# Patient Record
Sex: FEMALE | Race: BLACK OR AFRICAN AMERICAN | NOT HISPANIC OR LATINO
[De-identification: names, ages, dates, MRNs, and addresses within clinical notes are randomized per-mention and may not be internally consistent; named-entity substitution may affect disease eponyms.]

---

## 2020-03-16 PROBLEM — Z00.00 ENCOUNTER FOR PREVENTIVE HEALTH EXAMINATION: Status: ACTIVE | Noted: 2020-03-16

## 2020-03-17 ENCOUNTER — APPOINTMENT (OUTPATIENT)
Dept: OBGYN | Facility: CLINIC | Age: 40
End: 2020-03-17

## 2020-07-17 ENCOUNTER — ASOB RESULT (OUTPATIENT)
Age: 40
End: 2020-07-17

## 2020-07-17 ENCOUNTER — APPOINTMENT (OUTPATIENT)
Dept: ANTEPARTUM | Facility: CLINIC | Age: 40
End: 2020-07-17
Payer: COMMERCIAL

## 2020-07-17 PROCEDURE — 76811 OB US DETAILED SNGL FETUS: CPT

## 2020-07-17 PROCEDURE — 76819 FETAL BIOPHYS PROFIL W/O NST: CPT

## 2020-08-19 ENCOUNTER — TRANSCRIPTION ENCOUNTER (OUTPATIENT)
Age: 40
End: 2020-08-19

## 2020-08-20 ENCOUNTER — INPATIENT (INPATIENT)
Facility: HOSPITAL | Age: 40
LOS: 4 days | Discharge: HOME | End: 2020-08-25
Attending: OBSTETRICS & GYNECOLOGY | Admitting: OBSTETRICS & GYNECOLOGY
Payer: COMMERCIAL

## 2020-08-20 ENCOUNTER — RESULT REVIEW (OUTPATIENT)
Age: 40
End: 2020-08-20

## 2020-08-20 VITALS — WEIGHT: 255.07 LBS | HEIGHT: 66 IN

## 2020-08-20 DIAGNOSIS — O03.9 COMPLETE OR UNSPECIFIED SPONTANEOUS ABORTION WITHOUT COMPLICATION: Chronic | ICD-10-CM

## 2020-08-20 DIAGNOSIS — Z98.890 OTHER SPECIFIED POSTPROCEDURAL STATES: Chronic | ICD-10-CM

## 2020-08-20 DIAGNOSIS — O26.899 OTHER SPECIFIED PREGNANCY RELATED CONDITIONS, UNSPECIFIED TRIMESTER: ICD-10-CM

## 2020-08-20 DIAGNOSIS — Z3A.00 WEEKS OF GESTATION OF PREGNANCY NOT SPECIFIED: ICD-10-CM

## 2020-08-20 DIAGNOSIS — Z33.2 ENCOUNTER FOR ELECTIVE TERMINATION OF PREGNANCY: Chronic | ICD-10-CM

## 2020-08-20 LAB
BASOPHILS # BLD AUTO: 0.01 K/UL — SIGNIFICANT CHANGE UP (ref 0–0.2)
BASOPHILS NFR BLD AUTO: 0.1 % — SIGNIFICANT CHANGE UP (ref 0–2)
BLD GP AB SCN SERPL QL: NEGATIVE — SIGNIFICANT CHANGE UP
EOSINOPHIL # BLD AUTO: 0.07 K/UL — SIGNIFICANT CHANGE UP (ref 0–0.5)
EOSINOPHIL NFR BLD AUTO: 0.8 % — SIGNIFICANT CHANGE UP (ref 0–6)
HBV SURFACE AG SER-ACNC: NEGATIVE — SIGNIFICANT CHANGE UP
HCT VFR BLD CALC: 37.2 % — SIGNIFICANT CHANGE UP (ref 34.5–45)
HGB BLD-MCNC: 12.4 G/DL — SIGNIFICANT CHANGE UP (ref 11.5–15.5)
HIV COMBO RESULT: SIGNIFICANT CHANGE UP
HIV1+2 AB SPEC QL: SIGNIFICANT CHANGE UP
IMM GRANULOCYTES NFR BLD AUTO: 0.5 % — SIGNIFICANT CHANGE UP (ref 0–1.5)
LYMPHOCYTES # BLD AUTO: 1.61 K/UL — SIGNIFICANT CHANGE UP (ref 1–3.3)
LYMPHOCYTES # BLD AUTO: 19 % — SIGNIFICANT CHANGE UP (ref 13–44)
MCHC RBC-ENTMCNC: 29 PG — SIGNIFICANT CHANGE UP (ref 27–34)
MCHC RBC-ENTMCNC: 33.3 % — SIGNIFICANT CHANGE UP (ref 32–36)
MCV RBC AUTO: 87.1 FL — SIGNIFICANT CHANGE UP (ref 80–100)
MONOCYTES # BLD AUTO: 0.53 K/UL — SIGNIFICANT CHANGE UP (ref 0–0.9)
MONOCYTES NFR BLD AUTO: 6.3 % — SIGNIFICANT CHANGE UP (ref 2–14)
NEUTROPHILS # BLD AUTO: 6.2 K/UL — SIGNIFICANT CHANGE UP (ref 1.8–7.4)
NEUTROPHILS NFR BLD AUTO: 73.3 % — SIGNIFICANT CHANGE UP (ref 43–77)
NRBC # FLD: 0 K/UL — SIGNIFICANT CHANGE UP (ref 0–0)
PLATELET # BLD AUTO: 206 K/UL — SIGNIFICANT CHANGE UP (ref 150–400)
PMV BLD: 10.4 FL — SIGNIFICANT CHANGE UP (ref 7–13)
RBC # BLD: 4.27 M/UL — SIGNIFICANT CHANGE UP (ref 3.8–5.2)
RBC # FLD: 13.9 % — SIGNIFICANT CHANGE UP (ref 10.3–14.5)
RH IG SCN BLD-IMP: POSITIVE — SIGNIFICANT CHANGE UP
RH IG SCN BLD-IMP: POSITIVE — SIGNIFICANT CHANGE UP
SARS-COV-2 RNA SPEC QL NAA+PROBE: SIGNIFICANT CHANGE UP
T PALLIDUM AB TITR SER: NEGATIVE — SIGNIFICANT CHANGE UP
WBC # BLD: 8.46 K/UL — SIGNIFICANT CHANGE UP (ref 3.8–10.5)
WBC # FLD AUTO: 8.46 K/UL — SIGNIFICANT CHANGE UP (ref 3.8–10.5)

## 2020-08-20 RX ORDER — OXYTOCIN 10 UNIT/ML
2 VIAL (ML) INJECTION
Qty: 30 | Refills: 0 | Status: DISCONTINUED | OUTPATIENT
Start: 2020-08-20 | End: 2020-08-21

## 2020-08-20 RX ORDER — OXYTOCIN 10 UNIT/ML
333.33 VIAL (ML) INJECTION
Qty: 20 | Refills: 0 | Status: DISCONTINUED | OUTPATIENT
Start: 2020-08-20 | End: 2020-08-21

## 2020-08-20 RX ORDER — SODIUM CHLORIDE 9 MG/ML
1000 INJECTION, SOLUTION INTRAVENOUS ONCE
Refills: 0 | Status: COMPLETED | OUTPATIENT
Start: 2020-08-20 | End: 2020-08-21

## 2020-08-20 RX ORDER — SODIUM CHLORIDE 9 MG/ML
1000 INJECTION, SOLUTION INTRAVENOUS
Refills: 0 | Status: DISCONTINUED | OUTPATIENT
Start: 2020-08-20 | End: 2020-08-21

## 2020-08-20 RX ADMIN — Medication 2 MILLIUNIT(S)/MIN: at 20:35

## 2020-08-20 NOTE — OB PROVIDER TRIAGE NOTE - NSOBPROVIDERNOTE_OBGYN_ALL_OB_FT
41 y/o   @ 37.3 wks gest presents with c/o sROM @ 0800 mod amt clear odorless amniotic fluid with mild irregular cramping denies any VB reports +FM denies any n/v/d denies any fever or chills ap care has been uncomplicated thus far         abdomen: soft, nt on palp  + pooling  SVE: /-3  TAS vtx  EFW: 3175 grams  T(C): 37.1 (20 @ 09:00), Max: 37.1 (20 @ 08:58)  HR: 90 (20 @ 09:14) (84 - 90)  BP: 115/75 (20 @ 09:14) (115/75 - 131/83)  RR: 15 (20 @ 08:58) (15 - 15)  SpO2: --  cat 1 FHT  toco: irregular   d/w dr ahuja/ dr hanks  admit to L&D  pROM @ 37.3 wks gestation for po cytotec IOL  see admission orders      NKA  med hx: denies  surg hx:  D&C x's 2  gyn hx: denies  ob hx:  2008  FT 7#14  ETOP x's 2   SAB x's 2 complete  meds:  PNV    ht: 5'6  wt: 255

## 2020-08-20 NOTE — OB RN PATIENT PROFILE - PSH
Spontaneous   x2  Termination of pregnancy (fetus)  x2 H/O hand surgery  tumor removed 10 yrs ago  Spontaneous   x2  Termination of pregnancy (fetus)  x2

## 2020-08-20 NOTE — OB PROVIDER TRIAGE NOTE - NSOBPROC_OBGYN_ALL_OB
Patient is aware of echo results.  Patient verbalized understanding and was encouraged to keep follow up. JUAN FF,OBIE  
Unknown at This Time

## 2020-08-20 NOTE — OB PROVIDER H&P - ASSESSMENT
39 y/o   @ 37.3 wks gest presents with c/o sROM @ 0800 mod amt clear odorless amniotic fluid with mild irregular cramping denies any VB reports +FM denies any n/v/d denies any fever or chills ap care has been uncomplicated thus far         abdomen: soft, nt on palp  + pooling  SVE: 50/-3  TAS vtx  EFW: 3175 grams  T(C): 37.1 (20 @ 09:00), Max: 37.1 (20 @ 08:58)  HR: 90 (20 @ 09:14) (84 - 90)  BP: 115/75 (20 @ 09:14) (115/75 - 131/83)  RR: 15 (20 @ 08:58) (15 - 15)  SpO2: --  cat 1 FHT  toco: irregular   GBS- neg 2020  d/w dr ahuja/ dr hanks  admit to L&D  pROM @ 37.3 wks gestation for po cytotec IOL  see admission orders      NKA  med hx: denies  surg hx:  D&C x's 2  gyn hx: denies  ob hx:  2008  FT 7#14  ETOP x's 2   SAB x's 2 complete  meds:  PNV    ht: 5'6  wt: 255

## 2020-08-20 NOTE — CHART NOTE - NSCHARTNOTEFT_GEN_A_CORE
OB Attending Progress Note    Patient seen and evaluated at bedside.  Denies complaints.     T(C): 36.8 (08-20-20 @ 11:41), Max: 37.1 (08-20-20 @ 08:58)  HR: 81 (08-20-20 @ 09:44) (76 - 90)  BP: 114/70 (08-20-20 @ 09:44) (114/70 - 131/83)  RR: 16 (08-20-20 @ 09:27) (15 - 16)  SpO2: --    SVE:  1/50/-3  posterior/ firm tone    EFM: cateogry 1   [place]:  ctx q4 mins     A/P 40y P1 admitted for IOL PROM  -Labor:  start PO cytotec   -Fetus: cateogry 1 tracing  -GBS negative   -Analgesia: declines at this time     MARY Camara MD

## 2020-08-21 ENCOUNTER — TRANSCRIPTION ENCOUNTER (OUTPATIENT)
Age: 40
End: 2020-08-21

## 2020-08-21 LAB
ALBUMIN SERPL ELPH-MCNC: 2.8 G/DL — LOW (ref 3.3–5)
ALBUMIN SERPL ELPH-MCNC: 3 G/DL — LOW (ref 3.3–5)
ALP SERPL-CCNC: 114 U/L — SIGNIFICANT CHANGE UP (ref 40–120)
ALP SERPL-CCNC: 115 U/L — SIGNIFICANT CHANGE UP (ref 40–120)
ALT FLD-CCNC: 12 U/L — SIGNIFICANT CHANGE UP (ref 4–33)
ALT FLD-CCNC: 12 U/L — SIGNIFICANT CHANGE UP (ref 4–33)
ANION GAP SERPL CALC-SCNC: 13 MMO/L — SIGNIFICANT CHANGE UP (ref 7–14)
ANION GAP SERPL CALC-SCNC: 16 MMO/L — HIGH (ref 7–14)
ANISOCYTOSIS BLD QL: SLIGHT — SIGNIFICANT CHANGE UP
APPEARANCE UR: CLEAR — SIGNIFICANT CHANGE UP
APTT BLD: 27.5 SEC — SIGNIFICANT CHANGE UP (ref 27–36.3)
APTT BLD: 28.5 SEC — SIGNIFICANT CHANGE UP (ref 27–36.3)
AST SERPL-CCNC: 14 U/L — SIGNIFICANT CHANGE UP (ref 4–32)
AST SERPL-CCNC: 16 U/L — SIGNIFICANT CHANGE UP (ref 4–32)
BACTERIA # UR AUTO: NEGATIVE — SIGNIFICANT CHANGE UP
BASOPHILS # BLD AUTO: 0.01 K/UL — SIGNIFICANT CHANGE UP (ref 0–0.2)
BASOPHILS # BLD AUTO: 0.02 K/UL — SIGNIFICANT CHANGE UP (ref 0–0.2)
BASOPHILS NFR BLD AUTO: 0.1 % — SIGNIFICANT CHANGE UP (ref 0–2)
BASOPHILS NFR BLD AUTO: 0.1 % — SIGNIFICANT CHANGE UP (ref 0–2)
BASOPHILS NFR SPEC: 0 % — SIGNIFICANT CHANGE UP (ref 0–2)
BILIRUB SERPL-MCNC: 0.3 MG/DL — SIGNIFICANT CHANGE UP (ref 0.2–1.2)
BILIRUB SERPL-MCNC: 0.4 MG/DL — SIGNIFICANT CHANGE UP (ref 0.2–1.2)
BILIRUB UR-MCNC: NEGATIVE — SIGNIFICANT CHANGE UP
BLASTS # FLD: 0 % — SIGNIFICANT CHANGE UP (ref 0–0)
BLOOD UR QL VISUAL: NEGATIVE — SIGNIFICANT CHANGE UP
BUN SERPL-MCNC: 6 MG/DL — LOW (ref 7–23)
BUN SERPL-MCNC: 7 MG/DL — SIGNIFICANT CHANGE UP (ref 7–23)
CALCIUM SERPL-MCNC: 8.7 MG/DL — SIGNIFICANT CHANGE UP (ref 8.4–10.5)
CALCIUM SERPL-MCNC: 8.9 MG/DL — SIGNIFICANT CHANGE UP (ref 8.4–10.5)
CHLORIDE SERPL-SCNC: 100 MMOL/L — SIGNIFICANT CHANGE UP (ref 98–107)
CHLORIDE SERPL-SCNC: 100 MMOL/L — SIGNIFICANT CHANGE UP (ref 98–107)
CO2 SERPL-SCNC: 18 MMOL/L — LOW (ref 22–31)
CO2 SERPL-SCNC: 19 MMOL/L — LOW (ref 22–31)
COLOR SPEC: YELLOW — SIGNIFICANT CHANGE UP
CREAT ?TM UR-MCNC: 114.1 MG/DL — SIGNIFICANT CHANGE UP
CREAT SERPL-MCNC: 0.64 MG/DL — SIGNIFICANT CHANGE UP (ref 0.5–1.3)
CREAT SERPL-MCNC: 0.65 MG/DL — SIGNIFICANT CHANGE UP (ref 0.5–1.3)
EOSINOPHIL # BLD AUTO: 0 K/UL — SIGNIFICANT CHANGE UP (ref 0–0.5)
EOSINOPHIL # BLD AUTO: 0.02 K/UL — SIGNIFICANT CHANGE UP (ref 0–0.5)
EOSINOPHIL NFR BLD AUTO: 0 % — SIGNIFICANT CHANGE UP (ref 0–6)
EOSINOPHIL NFR BLD AUTO: 0.1 % — SIGNIFICANT CHANGE UP (ref 0–6)
EOSINOPHIL NFR FLD: 0 % — SIGNIFICANT CHANGE UP (ref 0–6)
FIBRINOGEN PPP-MCNC: 656 MG/DL — HIGH (ref 290–520)
FIBRINOGEN PPP-MCNC: 705 MG/DL — HIGH (ref 290–520)
GIANT PLATELETS BLD QL SMEAR: PRESENT — SIGNIFICANT CHANGE UP
GLUCOSE SERPL-MCNC: 107 MG/DL — HIGH (ref 70–99)
GLUCOSE SERPL-MCNC: 76 MG/DL — SIGNIFICANT CHANGE UP (ref 70–99)
GLUCOSE UR-MCNC: NEGATIVE — SIGNIFICANT CHANGE UP
HCT VFR BLD CALC: 31.4 % — LOW (ref 34.5–45)
HCT VFR BLD CALC: 33.9 % — LOW (ref 34.5–45)
HGB BLD-MCNC: 10.7 G/DL — LOW (ref 11.5–15.5)
HGB BLD-MCNC: 11.5 G/DL — SIGNIFICANT CHANGE UP (ref 11.5–15.5)
HYALINE CASTS # UR AUTO: SIGNIFICANT CHANGE UP
IMM GRANULOCYTES NFR BLD AUTO: 0.5 % — SIGNIFICANT CHANGE UP (ref 0–1.5)
IMM GRANULOCYTES NFR BLD AUTO: 0.5 % — SIGNIFICANT CHANGE UP (ref 0–1.5)
INR BLD: 1.02 — SIGNIFICANT CHANGE UP (ref 0.88–1.16)
INR BLD: 1.03 — SIGNIFICANT CHANGE UP (ref 0.88–1.16)
KETONES UR-MCNC: SIGNIFICANT CHANGE UP
LDH SERPL L TO P-CCNC: 176 U/L — SIGNIFICANT CHANGE UP (ref 135–225)
LDH SERPL L TO P-CCNC: 196 U/L — SIGNIFICANT CHANGE UP (ref 135–225)
LEUKOCYTE ESTERASE UR-ACNC: NEGATIVE — SIGNIFICANT CHANGE UP
LYMPHOCYTES # BLD AUTO: 0.58 K/UL — LOW (ref 1–3.3)
LYMPHOCYTES # BLD AUTO: 1.2 K/UL — SIGNIFICANT CHANGE UP (ref 1–3.3)
LYMPHOCYTES # BLD AUTO: 3.8 % — LOW (ref 13–44)
LYMPHOCYTES # BLD AUTO: 8.3 % — LOW (ref 13–44)
LYMPHOCYTES NFR SPEC AUTO: 1.7 % — LOW (ref 13–44)
MCHC RBC-ENTMCNC: 30.2 PG — SIGNIFICANT CHANGE UP (ref 27–34)
MCHC RBC-ENTMCNC: 30.4 PG — SIGNIFICANT CHANGE UP (ref 27–34)
MCHC RBC-ENTMCNC: 33.9 % — SIGNIFICANT CHANGE UP (ref 32–36)
MCHC RBC-ENTMCNC: 34.1 % — SIGNIFICANT CHANGE UP (ref 32–36)
MCV RBC AUTO: 89 FL — SIGNIFICANT CHANGE UP (ref 80–100)
MCV RBC AUTO: 89.2 FL — SIGNIFICANT CHANGE UP (ref 80–100)
METAMYELOCYTES # FLD: 0 % — SIGNIFICANT CHANGE UP (ref 0–1)
MICROCYTES BLD QL: SLIGHT — SIGNIFICANT CHANGE UP
MONOCYTES # BLD AUTO: 0.8 K/UL — SIGNIFICANT CHANGE UP (ref 0–0.9)
MONOCYTES # BLD AUTO: 0.86 K/UL — SIGNIFICANT CHANGE UP (ref 0–0.9)
MONOCYTES NFR BLD AUTO: 5.2 % — SIGNIFICANT CHANGE UP (ref 2–14)
MONOCYTES NFR BLD AUTO: 6 % — SIGNIFICANT CHANGE UP (ref 2–14)
MONOCYTES NFR BLD: 4.3 % — SIGNIFICANT CHANGE UP (ref 2–9)
MYELOCYTES NFR BLD: 0 % — SIGNIFICANT CHANGE UP (ref 0–0)
NEUTROPHIL AB SER-ACNC: 89.6 % — HIGH (ref 43–77)
NEUTROPHILS # BLD AUTO: 12.26 K/UL — HIGH (ref 1.8–7.4)
NEUTROPHILS # BLD AUTO: 13.96 K/UL — HIGH (ref 1.8–7.4)
NEUTROPHILS NFR BLD AUTO: 85 % — HIGH (ref 43–77)
NEUTROPHILS NFR BLD AUTO: 90.4 % — HIGH (ref 43–77)
NEUTS BAND # BLD: 3.5 % — SIGNIFICANT CHANGE UP (ref 0–6)
NITRITE UR-MCNC: NEGATIVE — SIGNIFICANT CHANGE UP
NRBC # FLD: 0 K/UL — SIGNIFICANT CHANGE UP (ref 0–0)
NRBC # FLD: 0 K/UL — SIGNIFICANT CHANGE UP (ref 0–0)
OTHER - HEMATOLOGY %: 0 — SIGNIFICANT CHANGE UP
PH UR: 7 — SIGNIFICANT CHANGE UP (ref 5–8)
PLATELET # BLD AUTO: 174 K/UL — SIGNIFICANT CHANGE UP (ref 150–400)
PLATELET # BLD AUTO: 185 K/UL — SIGNIFICANT CHANGE UP (ref 150–400)
PLATELET COUNT - ESTIMATE: NORMAL — SIGNIFICANT CHANGE UP
PMV BLD: 10.7 FL — SIGNIFICANT CHANGE UP (ref 7–13)
PMV BLD: 10.7 FL — SIGNIFICANT CHANGE UP (ref 7–13)
POLYCHROMASIA BLD QL SMEAR: SLIGHT — SIGNIFICANT CHANGE UP
POTASSIUM SERPL-MCNC: 3.5 MMOL/L — SIGNIFICANT CHANGE UP (ref 3.5–5.3)
POTASSIUM SERPL-MCNC: 3.5 MMOL/L — SIGNIFICANT CHANGE UP (ref 3.5–5.3)
POTASSIUM SERPL-SCNC: 3.5 MMOL/L — SIGNIFICANT CHANGE UP (ref 3.5–5.3)
POTASSIUM SERPL-SCNC: 3.5 MMOL/L — SIGNIFICANT CHANGE UP (ref 3.5–5.3)
PROMYELOCYTES # FLD: 0 % — SIGNIFICANT CHANGE UP (ref 0–0)
PROT SERPL-MCNC: 5.5 G/DL — LOW (ref 6–8.3)
PROT SERPL-MCNC: 5.7 G/DL — LOW (ref 6–8.3)
PROT UR-MCNC: 15.9 MG/DL — SIGNIFICANT CHANGE UP
PROT UR-MCNC: 20 — SIGNIFICANT CHANGE UP
PROTHROM AB SERPL-ACNC: 11.7 SEC — SIGNIFICANT CHANGE UP (ref 10.6–13.6)
PROTHROM AB SERPL-ACNC: 11.8 SEC — SIGNIFICANT CHANGE UP (ref 10.6–13.6)
RBC # BLD: 3.52 M/UL — LOW (ref 3.8–5.2)
RBC # BLD: 3.81 M/UL — SIGNIFICANT CHANGE UP (ref 3.8–5.2)
RBC # FLD: 13.9 % — SIGNIFICANT CHANGE UP (ref 10.3–14.5)
RBC # FLD: 14.1 % — SIGNIFICANT CHANGE UP (ref 10.3–14.5)
RBC CASTS # UR COMP ASSIST: HIGH (ref 0–?)
SODIUM SERPL-SCNC: 132 MMOL/L — LOW (ref 135–145)
SODIUM SERPL-SCNC: 134 MMOL/L — LOW (ref 135–145)
SP GR SPEC: 1.02 — SIGNIFICANT CHANGE UP (ref 1–1.04)
SQUAMOUS # UR AUTO: SIGNIFICANT CHANGE UP
URATE SERPL-MCNC: 4.3 MG/DL — SIGNIFICANT CHANGE UP (ref 2.5–7)
URATE SERPL-MCNC: 4.3 MG/DL — SIGNIFICANT CHANGE UP (ref 2.5–7)
UROBILINOGEN FLD QL: NORMAL — SIGNIFICANT CHANGE UP
VARIANT LYMPHS # BLD: 0.9 % — SIGNIFICANT CHANGE UP
WBC # BLD: 14.42 K/UL — HIGH (ref 3.8–10.5)
WBC # BLD: 15.43 K/UL — HIGH (ref 3.8–10.5)
WBC # FLD AUTO: 14.42 K/UL — HIGH (ref 3.8–10.5)
WBC # FLD AUTO: 15.43 K/UL — HIGH (ref 3.8–10.5)
WBC UR QL: SIGNIFICANT CHANGE UP (ref 0–?)

## 2020-08-21 PROCEDURE — 88305 TISSUE EXAM BY PATHOLOGIST: CPT | Mod: 26

## 2020-08-21 PROCEDURE — 88307 TISSUE EXAM BY PATHOLOGIST: CPT | Mod: 26

## 2020-08-21 RX ORDER — CITRIC ACID/SODIUM CITRATE 300-500 MG
30 SOLUTION, ORAL ORAL ONCE
Refills: 0 | Status: COMPLETED | OUTPATIENT
Start: 2020-08-21 | End: 2020-08-21

## 2020-08-21 RX ORDER — DIPHENOXYLATE HCL/ATROPINE 2.5-.025MG
2 TABLET ORAL ONCE
Refills: 0 | Status: DISCONTINUED | OUTPATIENT
Start: 2020-08-21 | End: 2020-08-21

## 2020-08-21 RX ORDER — AMPICILLIN TRIHYDRATE 250 MG
CAPSULE ORAL
Refills: 0 | Status: DISCONTINUED | OUTPATIENT
Start: 2020-08-21 | End: 2020-08-21

## 2020-08-21 RX ORDER — HEPARIN SODIUM 5000 [USP'U]/ML
10000 INJECTION INTRAVENOUS; SUBCUTANEOUS EVERY 12 HOURS
Refills: 0 | Status: DISCONTINUED | OUTPATIENT
Start: 2020-08-21 | End: 2020-08-25

## 2020-08-21 RX ORDER — FAMOTIDINE 10 MG/ML
20 INJECTION INTRAVENOUS ONCE
Refills: 0 | Status: COMPLETED | OUTPATIENT
Start: 2020-08-21 | End: 2020-08-21

## 2020-08-21 RX ORDER — ACETAMINOPHEN 500 MG
975 TABLET ORAL
Refills: 0 | Status: DISCONTINUED | OUTPATIENT
Start: 2020-08-21 | End: 2020-08-25

## 2020-08-21 RX ORDER — IBUPROFEN 200 MG
600 TABLET ORAL EVERY 6 HOURS
Refills: 0 | Status: COMPLETED | OUTPATIENT
Start: 2020-08-21 | End: 2021-07-20

## 2020-08-21 RX ORDER — CARBOPROST TROMETHAMINE 250 UG/ML
250 INJECTION, SOLUTION INTRAMUSCULAR ONCE
Refills: 0 | Status: COMPLETED | OUTPATIENT
Start: 2020-08-21 | End: 2020-08-21

## 2020-08-21 RX ORDER — OXYTOCIN 10 UNIT/ML
2 VIAL (ML) INJECTION
Qty: 30 | Refills: 0 | Status: DISCONTINUED | OUTPATIENT
Start: 2020-08-21 | End: 2020-08-21

## 2020-08-21 RX ORDER — SODIUM CHLORIDE 9 MG/ML
1000 INJECTION, SOLUTION INTRAVENOUS ONCE
Refills: 0 | Status: COMPLETED | OUTPATIENT
Start: 2020-08-21 | End: 2020-08-21

## 2020-08-21 RX ORDER — SIMETHICONE 80 MG/1
80 TABLET, CHEWABLE ORAL EVERY 4 HOURS
Refills: 0 | Status: DISCONTINUED | OUTPATIENT
Start: 2020-08-21 | End: 2020-08-25

## 2020-08-21 RX ORDER — LANOLIN
1 OINTMENT (GRAM) TOPICAL EVERY 6 HOURS
Refills: 0 | Status: DISCONTINUED | OUTPATIENT
Start: 2020-08-21 | End: 2020-08-25

## 2020-08-21 RX ORDER — METOCLOPRAMIDE HCL 10 MG
10 TABLET ORAL ONCE
Refills: 0 | Status: COMPLETED | OUTPATIENT
Start: 2020-08-21 | End: 2020-08-21

## 2020-08-21 RX ORDER — GENTAMICIN SULFATE 40 MG/ML
410 VIAL (ML) INJECTION ONCE
Refills: 0 | Status: COMPLETED | OUTPATIENT
Start: 2020-08-21 | End: 2020-08-21

## 2020-08-21 RX ORDER — ACETAMINOPHEN 500 MG
3 TABLET ORAL
Qty: 0 | Refills: 0 | DISCHARGE
Start: 2020-08-21

## 2020-08-21 RX ORDER — DIPHENHYDRAMINE HCL 50 MG
25 CAPSULE ORAL EVERY 6 HOURS
Refills: 0 | Status: DISCONTINUED | OUTPATIENT
Start: 2020-08-21 | End: 2020-08-25

## 2020-08-21 RX ORDER — ERTAPENEM SODIUM 1 G/1
1000 INJECTION, POWDER, LYOPHILIZED, FOR SOLUTION INTRAMUSCULAR; INTRAVENOUS ONCE
Refills: 0 | Status: COMPLETED | OUTPATIENT
Start: 2020-08-21 | End: 2020-08-21

## 2020-08-21 RX ORDER — AMPICILLIN TRIHYDRATE 250 MG
2 CAPSULE ORAL ONCE
Refills: 0 | Status: COMPLETED | OUTPATIENT
Start: 2020-08-21 | End: 2020-08-21

## 2020-08-21 RX ORDER — ACETAMINOPHEN 500 MG
975 TABLET ORAL ONCE
Refills: 0 | Status: COMPLETED | OUTPATIENT
Start: 2020-08-21 | End: 2020-08-21

## 2020-08-21 RX ORDER — KETOROLAC TROMETHAMINE 30 MG/ML
30 SYRINGE (ML) INJECTION EVERY 6 HOURS
Refills: 0 | Status: DISCONTINUED | OUTPATIENT
Start: 2020-08-21 | End: 2020-08-22

## 2020-08-21 RX ORDER — IBUPROFEN 200 MG
1 TABLET ORAL
Qty: 0 | Refills: 0 | DISCHARGE
Start: 2020-08-21

## 2020-08-21 RX ORDER — MAGNESIUM HYDROXIDE 400 MG/1
30 TABLET, CHEWABLE ORAL
Refills: 0 | Status: DISCONTINUED | OUTPATIENT
Start: 2020-08-21 | End: 2020-08-25

## 2020-08-21 RX ORDER — ERTAPENEM SODIUM 1 G/1
1000 INJECTION, POWDER, LYOPHILIZED, FOR SOLUTION INTRAMUSCULAR; INTRAVENOUS EVERY 24 HOURS
Refills: 0 | Status: DISCONTINUED | OUTPATIENT
Start: 2020-08-22 | End: 2020-08-23

## 2020-08-21 RX ORDER — ERTAPENEM SODIUM 1 G/1
INJECTION, POWDER, LYOPHILIZED, FOR SOLUTION INTRAMUSCULAR; INTRAVENOUS
Refills: 0 | Status: DISCONTINUED | OUTPATIENT
Start: 2020-08-21 | End: 2020-08-23

## 2020-08-21 RX ORDER — SODIUM CHLORIDE 9 MG/ML
1000 INJECTION, SOLUTION INTRAVENOUS
Refills: 0 | Status: DISCONTINUED | OUTPATIENT
Start: 2020-08-21 | End: 2020-08-22

## 2020-08-21 RX ORDER — OXYCODONE HYDROCHLORIDE 5 MG/1
5 TABLET ORAL ONCE
Refills: 0 | Status: DISCONTINUED | OUTPATIENT
Start: 2020-08-21 | End: 2020-08-25

## 2020-08-21 RX ORDER — OXYTOCIN 10 UNIT/ML
333.33 VIAL (ML) INJECTION
Qty: 20 | Refills: 0 | Status: DISCONTINUED | OUTPATIENT
Start: 2020-08-21 | End: 2020-08-22

## 2020-08-21 RX ORDER — AMPICILLIN TRIHYDRATE 250 MG
2 CAPSULE ORAL EVERY 6 HOURS
Refills: 0 | Status: DISCONTINUED | OUTPATIENT
Start: 2020-08-21 | End: 2020-08-21

## 2020-08-21 RX ORDER — OXYCODONE HYDROCHLORIDE 5 MG/1
5 TABLET ORAL
Refills: 0 | Status: DISCONTINUED | OUTPATIENT
Start: 2020-08-21 | End: 2020-08-25

## 2020-08-21 RX ORDER — TETANUS TOXOID, REDUCED DIPHTHERIA TOXOID AND ACELLULAR PERTUSSIS VACCINE, ADSORBED 5; 2.5; 8; 8; 2.5 [IU]/.5ML; [IU]/.5ML; UG/.5ML; UG/.5ML; UG/.5ML
0.5 SUSPENSION INTRAMUSCULAR ONCE
Refills: 0 | Status: DISCONTINUED | OUTPATIENT
Start: 2020-08-21 | End: 2020-08-25

## 2020-08-21 RX ADMIN — Medication 500 MILLIGRAM(S): at 01:39

## 2020-08-21 RX ADMIN — Medication 2 TABLET(S): at 11:00

## 2020-08-21 RX ADMIN — Medication 0.2 MILLIGRAM(S): at 07:20

## 2020-08-21 RX ADMIN — CARBOPROST TROMETHAMINE 250 MICROGRAM(S): 250 INJECTION, SOLUTION INTRAMUSCULAR at 07:20

## 2020-08-21 RX ADMIN — Medication 30 MILLILITER(S): at 06:40

## 2020-08-21 RX ADMIN — Medication 975 MILLIGRAM(S): at 00:53

## 2020-08-21 RX ADMIN — Medication 975 MILLIGRAM(S): at 10:53

## 2020-08-21 RX ADMIN — SODIUM CHLORIDE 2000 MILLILITER(S): 9 INJECTION, SOLUTION INTRAVENOUS at 00:00

## 2020-08-21 RX ADMIN — SODIUM CHLORIDE 2000 MILLILITER(S): 9 INJECTION, SOLUTION INTRAVENOUS at 06:29

## 2020-08-21 RX ADMIN — ERTAPENEM SODIUM 120 MILLIGRAM(S): 1 INJECTION, POWDER, LYOPHILIZED, FOR SOLUTION INTRAMUSCULAR; INTRAVENOUS at 09:55

## 2020-08-21 RX ADMIN — Medication 30 MILLIGRAM(S): at 15:25

## 2020-08-21 RX ADMIN — Medication 216 GRAM(S): at 00:53

## 2020-08-21 RX ADMIN — Medication 10 MILLIGRAM(S): at 06:40

## 2020-08-21 RX ADMIN — Medication 1000 MILLIUNIT(S)/MIN: at 10:43

## 2020-08-21 RX ADMIN — Medication 975 MILLIGRAM(S): at 00:54

## 2020-08-21 RX ADMIN — HEPARIN SODIUM 10000 UNIT(S): 5000 INJECTION INTRAVENOUS; SUBCUTANEOUS at 15:24

## 2020-08-21 RX ADMIN — Medication 2 MILLIUNIT(S)/MIN: at 03:11

## 2020-08-21 RX ADMIN — Medication 30 MILLIGRAM(S): at 15:40

## 2020-08-21 RX ADMIN — FAMOTIDINE 20 MILLIGRAM(S): 10 INJECTION INTRAVENOUS at 06:40

## 2020-08-21 NOTE — BRIEF OPERATIVE NOTE - NSICDXBRIEFPROCEDURE_GEN_ALL_CORE_FT
PROCEDURES:  Myomectomy by abdominal approach 21-Aug-2020 08:46:53  Lora Hernandez  Primary  section 21-Aug-2020 08:46:16  Lora Hernandez

## 2020-08-21 NOTE — OB NEONATOLOGY/PEDIATRICIAN DELIVERY SUMMARY - NSPEDSNEONOTESA_OBGYN_ALL_OB_FT
Baby is a 37.4 week GA male born to a 39 y/o  mother via . Maternal history uncomplicated. Pregnancy uncomplicated. Maternal blood type . Prenatal labs negative, non-reactive respectively; rubella pending at time of delivery. GBS negative on . PROM at 0800 on , 24 hours before delivery. Clear fluids. Maternal temperature throughout rupture, Tmax 38.2. Dx by OB with chorioamnionitis. Baby born vigorous and crying spontaneously. Baby tachycardic 180s-200s. Warmed, dried, stimulated. Apgars 8/9 . EOS score 0.51. Mom plans to breastfeed and bottlefeed. No hepB. Circ requested. Admitted to NICU.

## 2020-08-21 NOTE — OB PROVIDER DELIVERY SUMMARY - NSPROVIDERDELIVERYNOTE_OBGYN_ALL_OB_FT
Attending Note   Pt had primary LTCS for category 2 tracing and chorio remote from delivery   Viable male infant  apgars 8/9, 7# boy for circ, transferred to NICU   uterine atony noted and resolved with massage, double pitocin, cytotec, hemabate and TXA   Will get placenta cultures/blood cultures and continue invanz postoperative   EBL 1000/   IVF   R Mary Attending Note   Pt had primary LTCS for category 2 tracing and chorio remote from delivery   Viable male infant  apgars 8/9, 7# boy for circ, transferred to NICU   uterine atony noted and resolved with massage, double pitocin, cytotec, hemabate and TXA   Will get placenta cultures/blood cultures and continue invanz postoperative   3 cm fibroid noted at the location of the hysterotomy and removed fibroid that was sent to path     EBL 1000/   IVF 2000       R Mary

## 2020-08-21 NOTE — DISCHARGE NOTE OB - CARE PROVIDER_API CALL
Sindhu Irby  OBSTETRICS AND GYNECOLOGY  [address], [address]  [place] [address]  Phone: [phone]  Fax: [phone]  Follow Up Time:

## 2020-08-21 NOTE — DISCHARGE NOTE OB - HOSPITAL COURSE
She presented with rupture of membranes and had a c/section for fetal tachycardia and chorioamnioitis   She received invanz after delivery.

## 2020-08-21 NOTE — DISCHARGE NOTE OB - MATERIALS PROVIDED
Immunization Record/Guide to Postpartum Care/Vaccinations/Shaken Baby Prevention Handout/[facility] [place] Screening Program/[facility] Hearing Screen Program/Birth Certificate Instructions

## 2020-08-21 NOTE — CHART NOTE - NSCHARTNOTEFT_GEN_A_CORE
Subjective  Patient seen and examined at bedside. FHR tracing showing fetal tachycardia 170s. Patient reporting pain with contractions. +LOF.     Objective  Vital Signs Last 24 Hrs  T(C): 38.2 (21 Aug 2020 00:40), Max: 38.2 (21 Aug 2020 00:40)  T(F): 100.76 (21 Aug 2020 00:40), Max: 100.76 (21 Aug 2020 00:40)  HR: 86 (21 Aug 2020 00:49) (66 - 96)  BP: 145/79 (21 Aug 2020 00:41) (114/70 - 145/82)  RR: 16 (20 Aug 2020 09:27) (15 - 16)  SpO2: 100% (21 Aug 2020 00:49) (91% - 100%)    Gen: NAD  Abd: soft, non-tender, gravid  SVE: unable to examine cervix    TAUS: bladder distended. Patient voided and vertex now seen on sono    FHT: baseline 170, mod variability, +accels, neg decels  [place]: reg ctx q2-4 min    A/P 41 yo  37w3d IOL PROM 730a, now with intrapartum fever 38.2  - Ampicillin/Gentamicin/Tylenol for fever  - epidural for pain control  - will place ISE and IUPC after epidural    seen with Dr. Derek Rodgers pgy4 Subjective  Patient seen and examined at bedside. FHR tracing showing fetal tachycardia 170s. Patient reporting pain with contractions. +LOF.     Objective  Vital Signs Last 24 Hrs  T(C): 38.2 (21 Aug 2020 00:40), Max: 38.2 (21 Aug 2020 00:40)  T(F): 100.76 (21 Aug 2020 00:40), Max: 100.76 (21 Aug 2020 00:40)  HR: 86 (21 Aug 2020 00:49) (66 - 96)  BP: 145/79 (21 Aug 2020 00:41) (114/70 - 145/82)  RR: 16 (20 Aug 2020 09:27) (15 - 16)  SpO2: 100% (21 Aug 2020 00:49) (91% - 100%)    Gen: NAD  Abd: soft, non-tender, gravid  SVE: unable to examine cervix    TAUS: bladder distended. Patient voided and vertex now seen on sono    FHT: baseline 170, mod variability, +accels, neg decels  [place]: reg ctx q2-4 min    A/P 41 yo  37w3d IOL PROM 730a, now with intrapartum fever 38.2  - Ampicillin/Gentamicin/Tylenol for fever  - epidural for pain control Plan to reexamine s/p epidural   - consider placement of  ISE and IUPC after epidural, If necessary if not able to access/ monitor FHR    seen with Dr. Derek Rodgers pgy4    Attending note    I evaluated the tracing and patient with Dr Rodgers and agree with evaluation and management

## 2020-08-21 NOTE — OB RN INTRAOPERATIVE NOTE - NS_UTERINEINCISION_OBGYN_ALL_OB_DT
Procedure Ordered:CARDIAC ANGIO





Reason for Exam Today: NSTEMI





Previous Exams:





Allergies:NKA





Current Medications Taken:

Glucophage ( )  Metformin ( )





Previous reaction to contrast media:  Yes ( ) No ( )



Pregnant:       Yes ( ) No ( )             Asthma:           Yes ( ) No ( )

Diabetes:       Yes ( ) No ( )             Myeloma:          Yes ( ) No ( )

Heart Disease:  Yes ( ) No ( )             Cardiac Disease:  Yes ( ) No ( )

Kidney Disease: Yes ( ) No ( )             Vascular Disease: Yes ( ) No ( )



**NO TO ALL ABOVE

_______________________________________________________________________________

Patient Teaching done:  Yes (X ) No ( )      Used: Yes ( ) No ( )

                                           Name of :                              
                              Language Used:



As part of the test requested by your doctor, contrast media may be injected into your vein 
while the x-rays are being taken. Occasionally, reactions from IV contrast may occur. The 
physician and staff of this hospital are trained to treat these reactions.

_______________________________________________________________________________

Select the type of Contrast that will be given to patient:



Isovue 300 ( )   Isovue 370 ( ) Visipaque ( ) Cystografin ( ) 



Gastrographin ( ) Redi-cat ( ) Volumen ( ) OMNIPAQUE 350 (X)





Amount of contrast to be given:   98ML               IV (X )  PO ( )





Date given: 1/3/19





Lab Values:   BUN:   11         Creatinine: 0.56



Reason why contrast cannot be given:





Location of patient pre-procedure: [facility]





Location of patient post procedure: [facility] 21-Aug-2020 07:11

## 2020-08-21 NOTE — OB NEONATOLOGY/PEDIATRICIAN DELIVERY SUMMARY - BABY A: APGAR 1 MIN REFLEX IRRITABILITY, DELIVERY
Behavioral Health Interdisciplinary Rounds Patient Name: Arletta Boeck  Age: 62 y.o. Room/Bed:  326/01 Primary Diagnosis: [facility] Admission Status: Involuntary Commitment and Forced Medication Order Readmission within 30 days:  
Power of  in place:  
Patient requires a blocked bed: no           
Reason for blocked bed:  
Order for blocked bed obtained:     
 
Sleep hours: 8 Morning Labs completed per orders:        
Participation in Care/Groups:  yes Medication Compliant?: Yes PRNS (last 24 hours): Sleep Aid Restraints (last 24 hours):  no 
Substance Abuse:     
24 hour chart check complete: {Yes Patient goal(s) for today: Continue taking medications as prescribed; engage in unit activities; cooperate with SW for phone call to daughter Treatment team focus/goals: Provide 2nd dose of WATERMAN; assist Pt in calling daughter Progress note: Patient still exhibits poor insight and judgement. Talkative, cooperative, engaged. Discharge-focused. SW to set up mental health skill building and home health. LOS:  22  Expected LOS: 22-25 Financial concerns/prescription coverage: Cydney Walker Family contact: 4/3 SW spoke to daughter Family requesting physician contact today: No 
Discharge plan: Return home Access to weapons: No 
Outpatient provider(s): To be linked to new provider Patient's preferred phone number for follow up call: [phone] Participating treatment team members: Arletta Boeck, Zenia Ditch, MSW; Dr. Raciel Aponte MD 
 (2) cough or sneeze

## 2020-08-21 NOTE — BRIEF OPERATIVE NOTE - OPERATION/FINDINGS
viable male infant apgars 8/9  EBL 1000   Weight 7#3  normal tubes and ovaries   small fibroids noted, see dictation for details

## 2020-08-21 NOTE — DISCHARGE NOTE OB - CARE PLAN
Principal Discharge DX:	 delivery delivered  Goal:	return to normal health  Assessment and plan of treatment:	nothing in the vagina x 6 weeks  no heavy lifting x 6 weeks

## 2020-08-21 NOTE — DISCHARGE NOTE OB - PATIENT PORTAL LINK FT
You can access the FollowMyHealth Patient Portal offered by [facility] by registering at the following website: [email or web address]. By joining [facility]’s FollowMyHealth portal, you will also be able to view your health information using other applications (apps) compatible with our system.

## 2020-08-21 NOTE — OB RN DELIVERY SUMMARY - NS_LABORCHARACTER_OBGYN_ALL_OB
Induction of labor-Medicinal/Febrile (>38C)/Induction of labor-AROM/Augmentation of labor/External electronic FM/Fetal intolerance

## 2020-08-22 DIAGNOSIS — O41.1290 CHORIOAMNIONITIS, UNSPECIFIED TRIMESTER, NOT APPLICABLE OR UNSPECIFIED: ICD-10-CM

## 2020-08-22 LAB
BASOPHILS # BLD AUTO: 0.01 K/UL — SIGNIFICANT CHANGE UP (ref 0–0.2)
BASOPHILS NFR BLD AUTO: 0.1 % — SIGNIFICANT CHANGE UP (ref 0–2)
CULTURE RESULTS: NO GROWTH — SIGNIFICANT CHANGE UP
CULTURE RESULTS: NO GROWTH — SIGNIFICANT CHANGE UP
EOSINOPHIL # BLD AUTO: 0.1 K/UL — SIGNIFICANT CHANGE UP (ref 0–0.5)
EOSINOPHIL NFR BLD AUTO: 0.9 % — SIGNIFICANT CHANGE UP (ref 0–6)
HCT VFR BLD CALC: 30.5 % — LOW (ref 34.5–45)
HGB BLD-MCNC: 9.8 G/DL — LOW (ref 11.5–15.5)
IMM GRANULOCYTES NFR BLD AUTO: 0.2 % — SIGNIFICANT CHANGE UP (ref 0–1.5)
LYMPHOCYTES # BLD AUTO: 1.93 K/UL — SIGNIFICANT CHANGE UP (ref 1–3.3)
LYMPHOCYTES # BLD AUTO: 18 % — SIGNIFICANT CHANGE UP (ref 13–44)
MCHC RBC-ENTMCNC: 29.3 PG — SIGNIFICANT CHANGE UP (ref 27–34)
MCHC RBC-ENTMCNC: 32.1 % — SIGNIFICANT CHANGE UP (ref 32–36)
MCV RBC AUTO: 91 FL — SIGNIFICANT CHANGE UP (ref 80–100)
MONOCYTES # BLD AUTO: 0.66 K/UL — SIGNIFICANT CHANGE UP (ref 0–0.9)
MONOCYTES NFR BLD AUTO: 6.2 % — SIGNIFICANT CHANGE UP (ref 2–14)
NEUTROPHILS # BLD AUTO: 8.01 K/UL — HIGH (ref 1.8–7.4)
NEUTROPHILS NFR BLD AUTO: 74.6 % — SIGNIFICANT CHANGE UP (ref 43–77)
NRBC # FLD: 0 K/UL — SIGNIFICANT CHANGE UP (ref 0–0)
PLATELET # BLD AUTO: 159 K/UL — SIGNIFICANT CHANGE UP (ref 150–400)
PMV BLD: 10.3 FL — SIGNIFICANT CHANGE UP (ref 7–13)
RBC # BLD: 3.35 M/UL — LOW (ref 3.8–5.2)
RBC # FLD: 14.1 % — SIGNIFICANT CHANGE UP (ref 10.3–14.5)
SPECIMEN SOURCE: SIGNIFICANT CHANGE UP
SPECIMEN SOURCE: SIGNIFICANT CHANGE UP
WBC # BLD: 10.73 K/UL — HIGH (ref 3.8–10.5)
WBC # FLD AUTO: 10.73 K/UL — HIGH (ref 3.8–10.5)

## 2020-08-22 RX ORDER — IBUPROFEN 200 MG
600 TABLET ORAL EVERY 6 HOURS
Refills: 0 | Status: DISCONTINUED | OUTPATIENT
Start: 2020-08-22 | End: 2020-08-25

## 2020-08-22 RX ADMIN — Medication 975 MILLIGRAM(S): at 06:16

## 2020-08-22 RX ADMIN — HEPARIN SODIUM 10000 UNIT(S): 5000 INJECTION INTRAVENOUS; SUBCUTANEOUS at 06:15

## 2020-08-22 RX ADMIN — MAGNESIUM HYDROXIDE 30 MILLILITER(S): 400 TABLET, CHEWABLE ORAL at 06:15

## 2020-08-22 RX ADMIN — HEPARIN SODIUM 10000 UNIT(S): 5000 INJECTION INTRAVENOUS; SUBCUTANEOUS at 18:17

## 2020-08-22 RX ADMIN — Medication 975 MILLIGRAM(S): at 16:08

## 2020-08-22 RX ADMIN — ERTAPENEM SODIUM 120 MILLIGRAM(S): 1 INJECTION, POWDER, LYOPHILIZED, FOR SOLUTION INTRAMUSCULAR; INTRAVENOUS at 16:05

## 2020-08-22 RX ADMIN — SIMETHICONE 80 MILLIGRAM(S): 80 TABLET, CHEWABLE ORAL at 06:16

## 2020-08-22 RX ADMIN — Medication 975 MILLIGRAM(S): at 17:56

## 2020-08-22 RX ADMIN — Medication 600 MILLIGRAM(S): at 18:17

## 2020-08-22 RX ADMIN — Medication 600 MILLIGRAM(S): at 13:30

## 2020-08-22 RX ADMIN — Medication 975 MILLIGRAM(S): at 06:45

## 2020-08-22 RX ADMIN — Medication 600 MILLIGRAM(S): at 12:49

## 2020-08-22 RX ADMIN — SIMETHICONE 80 MILLIGRAM(S): 80 TABLET, CHEWABLE ORAL at 16:08

## 2020-08-22 NOTE — PROGRESS NOTE ADULT - PROBLEM SELECTOR PLAN 1
- Continue regular diet.  - OOB, Increase ambulation.  - Continue motrin, tylenol, oxycodone PRN for pain control.  - F/u AM CBC    Kiersten Rivas, PGY1

## 2020-08-22 NOTE — PROGRESS NOTE ADULT - PROBLEM SELECTOR PLAN 2
-Afebrile for 24 hours    -Monitor vitals  -F/u blood and urine cultures   -F/u Placental culture/pathology   -s/p A/G/T   -Invanz given, follow up duration of abx     Kiersten Rivas, PGY-1

## 2020-08-23 RX ADMIN — Medication 600 MILLIGRAM(S): at 05:20

## 2020-08-23 RX ADMIN — Medication 600 MILLIGRAM(S): at 21:47

## 2020-08-23 RX ADMIN — Medication 975 MILLIGRAM(S): at 09:00

## 2020-08-23 RX ADMIN — Medication 600 MILLIGRAM(S): at 00:35

## 2020-08-23 RX ADMIN — HEPARIN SODIUM 10000 UNIT(S): 5000 INJECTION INTRAVENOUS; SUBCUTANEOUS at 05:18

## 2020-08-23 RX ADMIN — Medication 975 MILLIGRAM(S): at 18:18

## 2020-08-23 RX ADMIN — Medication 600 MILLIGRAM(S): at 15:00

## 2020-08-23 RX ADMIN — Medication 600 MILLIGRAM(S): at 01:15

## 2020-08-23 RX ADMIN — Medication 600 MILLIGRAM(S): at 14:05

## 2020-08-23 RX ADMIN — Medication 600 MILLIGRAM(S): at 22:30

## 2020-08-23 RX ADMIN — Medication 975 MILLIGRAM(S): at 08:23

## 2020-08-23 RX ADMIN — Medication 600 MILLIGRAM(S): at 06:00

## 2020-08-23 RX ADMIN — HEPARIN SODIUM 10000 UNIT(S): 5000 INJECTION INTRAVENOUS; SUBCUTANEOUS at 18:07

## 2020-08-23 NOTE — PROGRESS NOTE ADULT - PROBLEM SELECTOR PLAN 1
- Continue with po analgesia  - Increase ambulation  - Continue regular diet  - saline IV lock  - No labs  -Monitor vitals  -F/u blood and urine cultures   -F/u Placental culture/pathology   -s/p A/G/T, Invanz (8/21-8/22) - Continue with po analgesia  - Increase ambulation  - Continue regular diet  - saline IV lock  - No labs  -Monitor vitals  -F/u blood and urine cultures   -F/u Placental culture/pathology   -s/p A/G/T, Invanz (8/21-8/22)    Josephine Moore, PGY-1

## 2020-08-23 NOTE — PROGRESS NOTE ADULT - ASSESSMENT
39y/o G_P_ POD#_ from _ for _ in stable condition. PMH significant for _. Current issues include _. 41yo POD#1 s/p LTCS with course c/b chorioamnionitis and a PPH. WBC downtrending, afebrile since 8/21@6A. Patient is stable and doing well post-operatively. 39yo POD#2 s/p LTCS with course c/b chorioamnionitis and a PPH. WBC downtrending, afebrile since 8/21@6A. Patient is stable and doing well post-operatively.

## 2020-08-23 NOTE — PROGRESS NOTE ADULT - ATTENDING COMMENTS
Ob attending    Pt seen and examined.  Chart reviewed.  Agree with resident MD assessment and plan  Continue routine post operative care
Patient seen and agree with above  POD #2 post CS  last temp @ 9 am 8/21  continue post op care  Discharge 8/24/20  ADA Rodriguez

## 2020-08-24 LAB
RUBV IGG SER-ACNC: 3.3 INDEX — SIGNIFICANT CHANGE UP
RUBV IGG SER-IMP: POSITIVE — SIGNIFICANT CHANGE UP

## 2020-08-24 RX ADMIN — HEPARIN SODIUM 10000 UNIT(S): 5000 INJECTION INTRAVENOUS; SUBCUTANEOUS at 06:34

## 2020-08-24 RX ADMIN — Medication 975 MILLIGRAM(S): at 09:02

## 2020-08-24 RX ADMIN — Medication 975 MILLIGRAM(S): at 22:34

## 2020-08-24 RX ADMIN — Medication 600 MILLIGRAM(S): at 07:17

## 2020-08-24 RX ADMIN — Medication 975 MILLIGRAM(S): at 09:50

## 2020-08-24 RX ADMIN — Medication 600 MILLIGRAM(S): at 11:31

## 2020-08-24 RX ADMIN — Medication 975 MILLIGRAM(S): at 21:32

## 2020-08-24 RX ADMIN — Medication 975 MILLIGRAM(S): at 00:46

## 2020-08-24 RX ADMIN — Medication 600 MILLIGRAM(S): at 06:35

## 2020-08-24 RX ADMIN — Medication 600 MILLIGRAM(S): at 19:04

## 2020-08-24 RX ADMIN — Medication 600 MILLIGRAM(S): at 20:00

## 2020-08-24 RX ADMIN — Medication 600 MILLIGRAM(S): at 12:20

## 2020-08-24 RX ADMIN — Medication 975 MILLIGRAM(S): at 01:30

## 2020-08-25 VITALS
SYSTOLIC BLOOD PRESSURE: 122 MMHG | RESPIRATION RATE: 16 BRPM | DIASTOLIC BLOOD PRESSURE: 74 MMHG | OXYGEN SATURATION: 99 % | TEMPERATURE: 98 F | HEART RATE: 83 BPM

## 2020-08-25 RX ADMIN — Medication 975 MILLIGRAM(S): at 07:18

## 2020-08-25 RX ADMIN — Medication 600 MILLIGRAM(S): at 10:48

## 2020-08-25 RX ADMIN — Medication 975 MILLIGRAM(S): at 12:52

## 2020-08-25 RX ADMIN — Medication 600 MILLIGRAM(S): at 09:48

## 2020-08-25 RX ADMIN — Medication 975 MILLIGRAM(S): at 13:50

## 2020-08-25 RX ADMIN — Medication 600 MILLIGRAM(S): at 03:06

## 2020-08-25 RX ADMIN — Medication 600 MILLIGRAM(S): at 04:00

## 2020-08-25 RX ADMIN — Medication 600 MILLIGRAM(S): at 15:51

## 2020-08-25 RX ADMIN — Medication 975 MILLIGRAM(S): at 06:18

## 2020-08-25 NOTE — PROGRESS NOTE ADULT - SUBJECTIVE AND OBJECTIVE BOX
Attending note     FHR now in last 30 min again tachycardia but now into the 190 range   I explained to the patient on exam no change Vtx -high and no significant change at 2.5cm and   70%   the patient asked for more time because do not want a  section   I told the patient again need to deliver as now no change in cervix despite pitocin and cervix shows remote from delivery with tachycardia and   febrile again .-- signed consent after discussion.   anesthesia informed , nursing informed - room set up and plan to transfer to OR
Attending Note  I introduced myself to the patient and informed of FHRT changes      Vital Signs Last 24 Hrs  T(C): 36.8 (20 Aug 2020 16:00), Max: 37.1 (20 Aug 2020 08:58)  T(F): 98.24 (20 Aug 2020 16:00), Max: 98.8 (20 Aug 2020 08:58)  HR: 75 (20 Aug 2020 15:58) (75 - 90)  BP: 124/77 (20 Aug 2020 15:58) (114/70 - 134/63)  BP(mean): --  RR: 16 (20 Aug 2020 09:27) (15 - 16)  SpO2: --    FETAL HEART RATE:140-145 noted minimal variability then moderate variability prior to exam and with exam noted + accels currently reassuring FHRT     Benitez: ctxs q 2-5 min     CERVICAL EXAM: 1-2 70%     PAIN SCALE (0-10):  comfortable     Assessment/ plan  d/w patient plan of management ( Cytotec 120pm and 2nd Cytotec at 340pm )   reassess tracing if reassuring plan for initiation of Pitocin    C Derek
Attending note     FHRT with fetal tachycardia c/w Maternal Fever  noted fhrt 170-180's and mod to minimal variability  and 3 variable late decels noted Then resolved variables but continued tachycardia   1L bolus given / Tylenol given / Ampicillin and Gentamycin given   I d/w the patient and significant other  section for FHRT and she states does not want a  section - she is aware of implications for the   baby and for her. Plan to reassess in 20-30 min   plan LLP Oxygen and reassess and re-discuss with patient regarding  delivery. LETTY Reed
OB Anesthesia Pain Service Note    Postop Day:  _1_ s/p   C- Section    THERAPY:  [  ] Spinal morphine:___mg  [x  ] Epidural morphine :_3__mg  [  ] IV PCA Hydromorphone: ___mg bolus dose every 6 minutes to ___mg 4 hour limit        Pain:               [ x  ] Controlled on current regieme                   [  ]  Other:    Sedation:	[x ] Alert	     [  ] Drowsy        [  ] Arousable	[  ] Asleep	     [  ] Unresponsive    Side Effects:	[ x ] None	     [  ] Nausea        [  ] Pruritus            [  ] Weakness     [  ] Numbness            [  ] Headache      ASSESSMENT/ PLAN:    [   ] Side effects resolving      [   ] Patient made aware of PRN meds available     [ x] Discontinue as per 24 hour protocol orders  & switch to PRN pain medications  as per OB service     [   ] Continue     Patient seen at: 07:58     Doing well, no anesthetic complications or complaints noted or reported.  Pain is controlled.
OB Progress Note:  Delivery, POD#1    S: 41yo POD#1 s/p pLTCS . Her pain is well controlled. She is tolerating a regular diet and passing flatus. Denies N/V. Denies CP/SOB/lightheadedness/dizziness. Endorses light vaginal bleeding, less than one pad per hour. She is ambulating without difficulty. Voiding spontaneously.     O:   Vital Signs Last 24 Hrs  T(C): 37.1 (22 Aug 2020 06:36), Max: 38 (21 Aug 2020 09:00)  T(F): 98.8 (22 Aug 2020 06:36), Max: 100.4 (21 Aug 2020 09:00)  HR: 94 (22 Aug 2020 06:36) (70 - 95)  BP: 114/70 (22 Aug 2020 06:36) (113/61 - 142/74)  BP(mean): 90 (21 Aug 2020 13:00) (74 - 90)  RR: 18 (22 Aug 2020 06:36) (12 - 20)  SpO2: 99% (22 Aug 2020 06:36) (95% - 100%)    Labs:  Blood type: O Positive  Rubella IgG: RPR: Negative                          9.8<L>   10.73<H> >-----------< 159    (  @ 06:57 )             30.5<L>                        11.5   15.43<H> >-----------< 174    (  @ 09:10 )             33.9<L>                        10.7<L>   14.42<H> >-----------< 185    (  @ 02:24 )             31.4<L>                        12.4   8.46 >-----------< 206    (  @ 09:52 )             37.2    20 @ 09:10      132<L>  |  100  |  6<L>  ----------------------------<  107<H>  3.5   |  19<L>  |  0.64    20 @ 02:24      134<L>  |  100  |  7   ----------------------------<  76  3.5   |  18<L>  |  0.65        Ca    8.9      21 Aug 2020 09:10  Ca    8.7      21 Aug 2020 02:24    TPro  5.7<L>  /  Alb  3.0<L>  /  TBili  0.3  /  DBili  x   /  AST  16  /  ALT  12  /  AlkPhos  114  20 @ 09:10  TPro  5.5<L>  /  Alb  2.8<L>  /  TBili  0.4  /  DBili  x   /  AST  14  /  ALT  12  /  AlkPhos  115  20 @ 02:24          PE:  General: NAD  Heart: extremities well-perfused  Lungs: breathing comfortably  Abdomen: Mildly distended, appropriately tender, firm fundus, incision c/d/i  Extremities: No erythema, no pitting edema
Patient seen and examined at bedside, no acute overnight events. No acute complaints, pain well controlled. Patient is ambulating, voiding spontaneously, passing flatus, and tolerating regular diet. Denies CP, SOB, N/V, HA, blurred vision, epigastric pain.    Vital Signs Last 24 Hours  T(C): 36.8 (08-22-20 @ 22:35), Max: 37.3 (08-22-20 @ 14:02)  HR: 80 (08-22-20 @ 22:35) (80 - 94)  BP: 107/62 (08-22-20 @ 22:35) (105/57 - 114/70)  RR: 18 (08-22-20 @ 22:35) (18 - 18)  SpO2: 100% (08-22-20 @ 22:35) (97% - 100%)    Physical Exam:  General: NAD  Abdomen: Soft, non-tender, non-distended, fundus firm  Incision: Pfannenstiel incision CDI, subcuticular suture closure  Pelvic: Lochia wnl    Labs:    Blood Type: O Positive  Antibody Screen: --  RPR: Negative               9.8    10.73 )-----------( 159      ( 08-22 @ 06:57 )             30.5                11.5   15.43 )-----------( 174      ( 08-21 @ 09:10 )             33.9                10.7   14.42 )-----------( 185      ( 08-21 @ 02:24 )             31.4         MEDICATIONS  (STANDING):  acetaminophen   Tablet .. 975 milliGRAM(s) Oral <User Schedule>  diphtheria/tetanus/pertussis (acellular) Vaccine (ADAcel) 0.5 milliLiter(s) IntraMuscular once  ertapenem  IVPB 1000 milliGRAM(s) IV Intermittent every 24 hours  ertapenem  IVPB      heparin   Injectable 53786 Unit(s) SubCutaneous every 12 hours  ibuprofen  Tablet. 600 milliGRAM(s) Oral every 6 hours    MEDICATIONS  (PRN):  diphenhydrAMINE 25 milliGRAM(s) Oral every 6 hours PRN Itching  lanolin Ointment 1 Application(s) Topical every 6 hours PRN Sore Nipples  magnesium hydroxide Suspension 30 milliLiter(s) Oral two times a day PRN Constipation  oxyCODONE    IR 5 milliGRAM(s) Oral every 3 hours PRN Moderate to Severe Pain (4-10)  oxyCODONE    IR 5 milliGRAM(s) Oral once PRN Moderate to Severe Pain (4-10)  simethicone 80 milliGRAM(s) Chew every 4 hours PRN Gas
SUBJECTIVE:    Pain: Controlled    Complaints: None    MILESTONES:    Alert and Oriented x 3  [ x ]  Out of bed/ ambulating. [ x ]  Flatus:   Positive [ x ]  Negative [  ]  Bowel movement  [  ] Positive [  ] Negative   Voiding [x  ] Due to void [  ]   Carson/Indwelling catheter in place [  ]  Diet: Regular [ x ]  Clears [  ]  NPO [  ]    Infant feeding:  Breast [  ]   Bottle [  ]  Both [ X ]  Feeding related issues and/or concerns:      OBJECTIVE:  T(C): 37.1 (20 @ 06:59), Max: 37.1 (20 @ 21:24)  HR: 82 (20 @ 06:59) (78 - 88)  BP: 116/79 (20 @ 06:59) (115/60 - 126/75)  RR: 14 (20 @ 06:59) (14 - 18)  SpO2: 100% (20 @ 06:59) (100% - 100%)  Wt(kg): --          Blood Type: O Positive    RPR: Negative    Rubella IgG: Positive (Positive=Immune, Negative=Non-Immune)        MEDICATIONS  (STANDING):  acetaminophen   Tablet .. 975 milliGRAM(s) Oral <User Schedule>  diphtheria/tetanus/pertussis (acellular) Vaccine (ADAcel) 0.5 milliLiter(s) IntraMuscular once  heparin   Injectable 93341 Unit(s) SubCutaneous every 12 hours  ibuprofen  Tablet. 600 milliGRAM(s) Oral every 6 hours    MEDICATIONS  (PRN):  diphenhydrAMINE 25 milliGRAM(s) Oral every 6 hours PRN Itching  lanolin Ointment 1 Application(s) Topical every 6 hours PRN Sore Nipples  magnesium hydroxide Suspension 30 milliLiter(s) Oral two times a day PRN Constipation  oxyCODONE    IR 5 milliGRAM(s) Oral every 3 hours PRN Moderate to Severe Pain (4-10)  oxyCODONE    IR 5 milliGRAM(s) Oral once PRN Moderate to Severe Pain (4-10)  simethicone 80 milliGRAM(s) Chew every 4 hours PRN Gas        ASSESSMENT:    40y     G 6     P   2042      PO Day#  4        Delivery: Primary [ X ]    Repeat [  ]       EBL - 1000,    QBL - 431        S/P IPT/ABX,    Blood Cult - Neg - Prelim, Urine Cult - Neg - Final                                  Indication of procedure: Abnormal Fetal Status, Chorio    Condition: Stable    Past Medical History significant for: HPI:      Current Issues:    Breasts:  Soft [x  ]   Engorged [  ]  Nipples:  Abdomen: Soft [ x ]   Distended [  ] Nontender [  ]     Bowel sounds :  Present [  ]  Absent [  ]   Fundus:  Firm [x  ]  Boggy [  ]    Abdominal incision: Clean, Dry and Intact [x  ]  Staples [  ] Steri Strips [  ] Dermabond [ X ] Sutures [  ]    Patient wearing abdominal binder for support.    Vaginal: Lochia:  Heavy [  ]  Moderate [ x ]   Scant [  ]    Extremities: Edema [  ] Negative Leroy's Sign [ X ] Nontender Jeremie  [ x ] Positive pedal pulses [  ]    Other relevant physical exam findings:      PLAN:    Plan: Increase ambulation, analgesia PRN and pain medication protocol standing oxycodone, ibuprofen and acetaminophen.    Diet: Regular diet    Continue routine post-operative and postpartum care.     Discharge Planning [ x ]    For discharge Today  [  X  ]    Consults:  Social Work [  ]  Lactation [ x ]  Other [         ]
SUBJECTIVE:    Pain: well controlled  Complaints: none    MILESTONES:    Alert and oriented x 3  [ x ]  Out of bed/ ambulating. [x  ]  Flatus: [x  ]  Postive [  ] Negative   Bowel movement  [ x ] Positive [  ] Negative   Voiding [x  ] Due to void [  ]   Diet: Regular [ x ]  Clears [  ]  NPO [  ]  Infant feeding:  Breast [ x ]   Bottle [  ]  Both [  ]  Feeding related inssues and/or concerns: none      OBJECTIVE:  T(C): 36.6 (20 @ 06:45), Max: 36.8 (20 @ 14:30)  HR: 80 (20 @ 06:45) (80 - 91)  BP: 108/66 (20 @ 06:45) (108/66 - 124/76)  RR: 18 (20 @ 06:45) (18 - 19)  SpO2: 100% (20 @ 06:45) (100% - 100%)  Wt(kg): --        MEDICATIONS  (STANDING):  acetaminophen   Tablet .. 975 milliGRAM(s) Oral <User Schedule>  diphtheria/tetanus/pertussis (acellular) Vaccine (ADAcel) 0.5 milliLiter(s) IntraMuscular once  heparin   Injectable 33940 Unit(s) SubCutaneous every 12 hours  ibuprofen  Tablet. 600 milliGRAM(s) Oral every 6 hours    MEDICATIONS  (PRN):  diphenhydrAMINE 25 milliGRAM(s) Oral every 6 hours PRN Itching  lanolin Ointment 1 Application(s) Topical every 6 hours PRN Sore Nipples  magnesium hydroxide Suspension 30 milliLiter(s) Oral two times a day PRN Constipation  oxyCODONE    IR 5 milliGRAM(s) Oral every 3 hours PRN Moderate to Severe Pain (4-10)  oxyCODONE    IR 5 milliGRAM(s) Oral once PRN Moderate to Severe Pain (4-10)  simethicone 80 milliGRAM(s) Chew every 4 hours PRN Gas        ASSESSMENT:  40y  y/o G  6 P 2   PO Day#  3 Chorioamnionitis, IPT s/p Invanz, A/G/C, blood culture preliminary negative, Urine culture final negative, PPH EBL 1000 - s/p haemabate, bucal cytotec,  labile BPs -labs normal      Delivery: Primary [ x ]    Repeat [  ]                                       Indication of procedure:  Condition: Stable  Past Medical History significant for: HPI:  Current Issues: none, Patient is afebrile now  Heart:       RRR                       Lungs: clear  Breasts:  Soft [ x ]   Engorged [  ]  Abdomen: Soft [x  ] , distended [  ] nontender [x  ]   Bowel sounds :  Present [  x]  Absent [  ]   Fundus firm [ x ]  Boggy [  ]  Abdominal incision: Clean, dry and intact [x  ]  Staples [  ] Steri Strips [  ] Dermabond [  ] Sutures [x    Patient wearing abdominal binder for support.  Vaginal: Lochia:  Heavy [  ]  Moderate [  ]   Scant [ x ]  Extremities: Edema [ 0 ] negative Leroy's Sign [ x ] Nontender Jeremie  [ x ] Positive pedal pulses [x  ]  Other relevant physical exam findings:      PLAN:  Plan: Increase ambulation, analgesia PRN and pain medication protocol standing oxycodone, ibuprofen and acetaminophen.  Diet: Regular diet  Continue routine post-operative and postpartum care.     Discharge Planning [x  ]  Consults:   Social Work [  ] Lac[place] [  ] Other [  ]
attending note    tracing review -  no longer variables tachycardia decreased with baseline 155-160 + accels   patient desires to continue induction Plan restart Pitocin

## 2020-08-25 NOTE — CHART NOTE - NSCHARTNOTEFT_GEN_A_CORE
Patient will not be going home tonight as baby needs to stay another night to be under the bili lights.     Kiersten Rivas, PGY-1

## 2020-08-26 LAB
CULTURE RESULTS: SIGNIFICANT CHANGE UP
SPECIMEN SOURCE: SIGNIFICANT CHANGE UP

## 2020-09-02 DIAGNOSIS — O42.10 PREMATURE RUPTURE OF MEMBRANES, ONSET OF LABOR MORE THAN 24 HOURS FOLLOWING RUPTURE, UNSPECIFIED WEEKS OF GESTATION: ICD-10-CM

## 2020-09-04 NOTE — OB RN PATIENT PROFILE - URINARY CATHETER
Bharti - pt requesting cephalexin for vaginal infection  She sates she thinks she has BV  She states this is what was given to her in June and relieved her sx  Pt states sx are the same as then  She never used the metrogel that was also given then  Pt was not seen the last time due to COVID-19  Last seen in December  Please advise if ok to treat or if pt needs to be seen.         
Diflucan 150 mg Sig Day 1,4,7.  #3 tablets    Kelfex 500 mg three times daily for 1 week.  #21.    If has Metrogel at home now can use that also.  
Keflex sent  Pt to advise if anything else needed.     
Pt messaged today with BV sx  She states they are the exact same as when she was treated in July  Pt was not seen in July  Pt asking for rx - ? Which one  Both diflucan and flagyl sent last time  Message to pt for more info    From: Ramonita Spears  To: LAYTON Phan  Sent: 9/4/2020 10:26 AM CDT  Subject: Medication Question    [place] and I probably should have known better to do that with my history of bacterial infections. My symptoms are exactly the same as they were in July. The medication that I was prescribed in July worked for me. Is it possible to do another round of medications as I have the same exact systems as I did before in July.    
no

## 2021-04-05 ENCOUNTER — RESULT REVIEW (OUTPATIENT)
Age: 41
End: 2021-04-05

## 2021-08-04 ENCOUNTER — TRANSCRIPTION ENCOUNTER (OUTPATIENT)
Age: 41
End: 2021-08-04

## 2021-11-12 NOTE — BRIEF OPERATIVE NOTE - TYPE OF ANESTHESIA
SUBJECTIVE:     MEDICATIONS  (STANDING):  lactated ringers. 1000 milliLiter(s) (120 mL/Hr) IV Continuous <Continuous>    MEDICATIONS  (PRN):  ondansetron Injectable 4 milliGRAM(s) IV Push every 6 hours PRN Nausea and/or Vomiting  oxyCODONE    IR 2.5 milliGRAM(s) Oral every 8 hours PRN Mild Pain (1 - 3)  oxyCODONE    IR 5 milliGRAM(s) Oral every 6 hours PRN Moderate Pain (4 - 6)      Vital Signs Last 24 Hrs  T(C): 36.1 (12 Nov 2021 10:29), Max: 36.1 (12 Nov 2021 10:29)  T(F): 97 (12 Nov 2021 10:29), Max: 97 (12 Nov 2021 10:29)  HR: 76 (12 Nov 2021 12:14) (76 - 93)  BP: 111/56 (12 Nov 2021 12:14) (111/56 - 140/78)  BP(mean): 92 (12 Nov 2021 10:44) (92 - 101)  RR: 18 (12 Nov 2021 12:14) (15 - 19)  SpO2: 97% (12 Nov 2021 12:14) (95% - 97%)    Physical Exam:  General: NAD, resting comfortably in bed  Pulmonary: Nonlabored breathing, no respiratory distress  Cardiovascular: NSR  Abdominal: soft, NT/ND  Extremities: WWP, normal strength  Neuro: A/O x 3, CNs II-XII grossly intact, no focal deficits, normal motor/sensation  Pulses: palpable distal pulses    I&O's Summary    12 Nov 2021 07:01  -  12 Nov 2021 12:30  --------------------------------------------------------  IN: 360 mL / OUT: 15 mL / NET: 345 mL        LABS:                        13.7   5.19  )-----------( 194      ( 12 Nov 2021 10:47 )             42.6     11-12    137  |  107  |  18  ----------------------------<  143<H>  4.0   |  23  |  0.74    Ca    10.2      12 Nov 2021 10:47  Phos  3.1     11-12  Mg     2.1     11-12    TPro  7.5  /  Alb  4.4  /  TBili  0.3  /  DBili  x   /  AST  31  /  ALT  46<H>  /  AlkPhos  79  11-12        CAPILLARY BLOOD GLUCOSE        LIVER FUNCTIONS - ( 12 Nov 2021 10:47 )  Alb: 4.4 g/dL / Pro: 7.5 g/dL / ALK PHOS: 79 U/L / ALT: 46 U/L / AST: 31 U/L / GGT: x             RADIOLOGY & ADDITIONAL STUDIES:   POC S/P right superior parathyroidectomy    SUBJECTIVE: Patient visited bedside in PACU, recovering from parathyroidectomy. Pain is well controlled. Denies fever, chills, N/V, SOB, CP, pain in numbness, tingling or weakness or pain in extremities.    MEDICATIONS  (STANDING):  lactated ringers. 1000 milliLiter(s) (120 mL/Hr) IV Continuous <Continuous>    MEDICATIONS  (PRN):  ondansetron Injectable 4 milliGRAM(s) IV Push every 6 hours PRN Nausea and/or Vomiting  oxyCODONE    IR 2.5 milliGRAM(s) Oral every 8 hours PRN Mild Pain (1 - 3)  oxyCODONE    IR 5 milliGRAM(s) Oral every 6 hours PRN Moderate Pain (4 - 6)    Vital Signs Last 24 Hrs  T(C): 36.1 (12 Nov 2021 10:29), Max: 36.1 (12 Nov 2021 10:29)  T(F): 97 (12 Nov 2021 10:29), Max: 97 (12 Nov 2021 10:29)  HR: 76 (12 Nov 2021 12:14) (76 - 93)  BP: 111/56 (12 Nov 2021 12:14) (111/56 - 140/78)  BP(mean): 92 (12 Nov 2021 10:44) (92 - 101)  RR: 18 (12 Nov 2021 12:14) (15 - 19)  SpO2: 97% (12 Nov 2021 12:14) (95% - 97%)    Physical Exam:  General: NAD, resting comfortably in bed  HEENT: Elastic bandage with dressing and steristrips over parathyroidectomy incision site. No swelling, erythema, tenderness, oozing or discharge noted. SHAYY draining sanguinous fluid (20cc overall).  Pulmonary: Nonlabored breathing, no respiratory distress  Cardiovascular: NSR  Abdominal: soft, NT/ND  Extremities: WWP, normal strength  Neuro: A/O x 3, CNs II-XII grossly intact, no focal deficits, normal motor/sensation  Pulses: palpable distal pulses    I&O's Summary    12 Nov 2021 07:01  -  12 Nov 2021 12:30  --------------------------------------------------------  IN: 360 mL / OUT: 15 mL / NET: 345 mL        LABS:                        13.7   5.19  )-----------( 194      ( 12 Nov 2021 10:47 )             42.6     11-12    137  |  107  |  18  ----------------------------<  143<H>  4.0   |  23  |  0.74    Ca    10.2      12 Nov 2021 10:47  Phos  3.1     11-12  Mg     2.1     11-12    TPro  7.5  /  Alb  4.4  /  TBili  0.3  /  DBili  x   /  AST  31  /  ALT  46<H>  /  AlkPhos  79  11-12        CAPILLARY BLOOD GLUCOSE        LIVER FUNCTIONS - ( 12 Nov 2021 10:47 )  Alb: 4.4 g/dL / Pro: 7.5 g/dL / ALK PHOS: 79 U/L / ALT: 46 U/L / AST: 31 U/L / GGT: x             RADIOLOGY & ADDITIONAL STUDIES:   Regional

## 2022-05-16 ENCOUNTER — OUTPATIENT (OUTPATIENT)
Dept: OUTPATIENT SERVICES | Facility: HOSPITAL | Age: 42
LOS: 1 days | End: 2022-05-16
Payer: COMMERCIAL

## 2022-05-16 ENCOUNTER — APPOINTMENT (OUTPATIENT)
Dept: MAMMOGRAPHY | Facility: IMAGING CENTER | Age: 42
End: 2022-05-16
Payer: COMMERCIAL

## 2022-05-16 DIAGNOSIS — O03.9 COMPLETE OR UNSPECIFIED SPONTANEOUS ABORTION WITHOUT COMPLICATION: Chronic | ICD-10-CM

## 2022-05-16 DIAGNOSIS — Z33.2 ENCOUNTER FOR ELECTIVE TERMINATION OF PREGNANCY: Chronic | ICD-10-CM

## 2022-05-16 DIAGNOSIS — Z98.890 OTHER SPECIFIED POSTPROCEDURAL STATES: Chronic | ICD-10-CM

## 2022-05-16 DIAGNOSIS — Z00.8 ENCOUNTER FOR OTHER GENERAL EXAMINATION: ICD-10-CM

## 2022-05-16 PROCEDURE — 77063 BREAST TOMOSYNTHESIS BI: CPT

## 2022-05-16 PROCEDURE — 77067 SCR MAMMO BI INCL CAD: CPT | Mod: 26

## 2022-05-16 PROCEDURE — 77067 SCR MAMMO BI INCL CAD: CPT

## 2022-05-16 PROCEDURE — 77063 BREAST TOMOSYNTHESIS BI: CPT | Mod: 26

## 2023-05-30 ENCOUNTER — NON-APPOINTMENT (OUTPATIENT)
Age: 43
End: 2023-05-30

## 2023-06-19 NOTE — CHART NOTE - NSCHARTNOTESELECT_GEN_ALL_CORE
OB Chart Note Hydroxychloroquine Pregnancy And Lactation Text: This medication has been shown to cause fetal harm but it isn't assigned a Pregnancy Risk Category. There are small amounts excreted in breast milk.

## 2023-08-07 ENCOUNTER — OUTPATIENT (OUTPATIENT)
Dept: OUTPATIENT SERVICES | Facility: HOSPITAL | Age: 43
LOS: 1 days | End: 2023-08-07
Payer: COMMERCIAL

## 2023-08-07 ENCOUNTER — APPOINTMENT (OUTPATIENT)
Dept: MAMMOGRAPHY | Facility: IMAGING CENTER | Age: 43
End: 2023-08-07
Payer: COMMERCIAL

## 2023-08-07 DIAGNOSIS — O03.9 COMPLETE OR UNSPECIFIED SPONTANEOUS ABORTION WITHOUT COMPLICATION: Chronic | ICD-10-CM

## 2023-08-07 DIAGNOSIS — Z33.2 ENCOUNTER FOR ELECTIVE TERMINATION OF PREGNANCY: Chronic | ICD-10-CM

## 2023-08-07 DIAGNOSIS — Z98.890 OTHER SPECIFIED POSTPROCEDURAL STATES: Chronic | ICD-10-CM

## 2023-08-07 DIAGNOSIS — Z00.8 ENCOUNTER FOR OTHER GENERAL EXAMINATION: ICD-10-CM

## 2023-08-07 PROCEDURE — 77063 BREAST TOMOSYNTHESIS BI: CPT | Mod: 26

## 2023-08-07 PROCEDURE — 77067 SCR MAMMO BI INCL CAD: CPT

## 2023-08-07 PROCEDURE — 77063 BREAST TOMOSYNTHESIS BI: CPT

## 2023-08-07 PROCEDURE — 77067 SCR MAMMO BI INCL CAD: CPT | Mod: 26

## 2023-09-21 NOTE — OB RN DELIVERY SUMMARY - NS_SEPSISRSKCALC_OBGYN_ALL_OB_FT
done
EOS calculated successfully. EOS Risk Factor: 0.5/1000 live births ([facility] national incidence); GA=37w4d; Temp=100.76; ROM=23.45; GBS='Negative'; Antibiotics='Broad spectrum antibiotics 2-3.9 hrs prior to birth'

## 2024-03-12 NOTE — OB PROVIDER H&P - NS_VACUUMDELIVERY_OBGYN_ALL_OB_NU
0

## 2024-10-13 NOTE — OB PROVIDER H&P - NSPPHNORISK_OBGYN_ALL_OB
Name band; In my judgment no risk for PPH has been identified at this time. After evaluating the patient it has been determined they are at risk for postpartum hemorrhage.

## 2024-12-16 ENCOUNTER — APPOINTMENT (OUTPATIENT)
Dept: MAMMOGRAPHY | Facility: IMAGING CENTER | Age: 44
End: 2024-12-16
Payer: COMMERCIAL

## 2024-12-16 ENCOUNTER — OUTPATIENT (OUTPATIENT)
Dept: OUTPATIENT SERVICES | Facility: HOSPITAL | Age: 44
LOS: 1 days | End: 2024-12-16
Payer: COMMERCIAL

## 2024-12-16 DIAGNOSIS — Z98.890 OTHER SPECIFIED POSTPROCEDURAL STATES: Chronic | ICD-10-CM

## 2024-12-16 DIAGNOSIS — Z00.8 ENCOUNTER FOR OTHER GENERAL EXAMINATION: ICD-10-CM

## 2024-12-16 DIAGNOSIS — O03.9 COMPLETE OR UNSPECIFIED SPONTANEOUS ABORTION WITHOUT COMPLICATION: Chronic | ICD-10-CM

## 2024-12-16 DIAGNOSIS — Z33.2 ENCOUNTER FOR ELECTIVE TERMINATION OF PREGNANCY: Chronic | ICD-10-CM

## 2024-12-16 PROCEDURE — 77067 SCR MAMMO BI INCL CAD: CPT

## 2024-12-16 PROCEDURE — 77063 BREAST TOMOSYNTHESIS BI: CPT

## 2024-12-16 PROCEDURE — 77063 BREAST TOMOSYNTHESIS BI: CPT | Mod: 26

## 2024-12-16 PROCEDURE — 77067 SCR MAMMO BI INCL CAD: CPT | Mod: 26

## 2025-03-19 NOTE — OB PROVIDER IHI INDUCTION/AUGMENTATION NOTE - LABOR: CERVICAL DILATION
Patient presenting with history of alcohol use presenting with melena, Hx of NSAIDs usage. HD stable. Imaging showed liver cirrhosis with small ascites. Will need urgent EGD to rule out variceal bleed. Keep NPO, Protonix and Octreotide drip, transfuse to keep HGB above 7. Risks and benefits of EGD discussed with patient who is agreeable to have procedure done. Rest of recommendations as above
1-1.9 cm

## 2025-04-15 NOTE — OB PROVIDER TRIAGE NOTE - LABOR: CERVICAL EFFACEMENT
[Time Spent: ___ minutes] : I have spent [unfilled] minutes of time on the encounter which excludes teaching and separately reported services. 50-74%